# Patient Record
Sex: MALE | NOT HISPANIC OR LATINO | Employment: OTHER | URBAN - METROPOLITAN AREA
[De-identification: names, ages, dates, MRNs, and addresses within clinical notes are randomized per-mention and may not be internally consistent; named-entity substitution may affect disease eponyms.]

---

## 2018-07-02 ENCOUNTER — TELEPHONE (OUTPATIENT)
Dept: FAMILY MEDICINE CLINIC | Facility: CLINIC | Age: 68
End: 2018-07-02

## 2018-07-02 ENCOUNTER — OFFICE VISIT (OUTPATIENT)
Dept: FAMILY MEDICINE CLINIC | Facility: CLINIC | Age: 68
End: 2018-07-02
Payer: MEDICARE

## 2018-07-02 VITALS — DIASTOLIC BLOOD PRESSURE: 70 MMHG | WEIGHT: 160 LBS | SYSTOLIC BLOOD PRESSURE: 110 MMHG

## 2018-07-02 DIAGNOSIS — R05.9 COUGH: Primary | ICD-10-CM

## 2018-07-02 DIAGNOSIS — F01.50 VASCULAR DEMENTIA WITHOUT BEHAVIORAL DISTURBANCE (HCC): ICD-10-CM

## 2018-07-02 PROCEDURE — 99213 OFFICE O/P EST LOW 20 MIN: CPT | Performed by: SPECIALIST

## 2018-07-02 RX ORDER — ASPIRIN AND DIPYRIDAMOLE 25; 200 MG/1; MG/1
1 CAPSULE, EXTENDED RELEASE ORAL EVERY 12 HOURS SCHEDULED
COMMUNITY

## 2018-07-02 RX ORDER — CHOLECALCIFEROL (VITAMIN D3) 1250 MCG
1 CAPSULE ORAL 2 TIMES WEEKLY
COMMUNITY

## 2018-07-02 RX ORDER — LEVOTHYROXINE SODIUM 0.03 MG/1
25 TABLET ORAL DAILY
COMMUNITY

## 2018-07-02 RX ORDER — MEMANTINE HYDROCHLORIDE 10 MG/1
10 TABLET ORAL 2 TIMES DAILY
COMMUNITY

## 2018-07-02 NOTE — PROGRESS NOTES
Assessment/Plan:  NEEDS  PT  AND  HOME  HEALTH  AIDE     Diagnoses and all orders for this visit:    Vascular dementia without behavioral disturbance    Other orders  -     levothyroxine 25 mcg tablet; Take 25 mcg by mouth daily  -     Saw Palmetto, Serenoa repens, ([facility] SAW PALMETTO) 450 MG CAPS; Take by mouth  -     memantine (NAMENDA) 10 mg tablet; Take 10 mg by mouth 2 (two) times a day  -     Multiple Vitamin (MULTI VITAMIN DAILY PO); Take 1 tablet by mouth once a week  -     Cholecalciferol (VITAMIN D3) 07912 units CAPS; Take 1 capsule by mouth 2 (two) times a week  -     aspirin-dipyridamole (AGGRENOX)  mg per 12 hr capsule; Take 1 capsule by mouth every 12 (twelve) hours          Subjective:      Patient ID: Ambrosio Butt is a 79 y o  female  THIS IS A FOLLOW-UP VISIT WITH MULTI-INFARCT DEMENTIA PATIENT IS COOPERATIVE HOWEVER HE NEEDS HELP TO ADDRESS IS WEARING DIAPER IS RIGHT LOWER EXTREMITY WEAKNESS USES A CANE HIS HEAD  NO LYTES TO BE TIRED  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 PATIENT NEEDS WILL INCLUDE HOME HEALTH AIDE AND HOME VISITING  HE DOES NOT HAVE ANY NEW MOTOR WEAKNESS IS IS QUITE HARD OF HEARING HIS WEIGHT AND APPETITE ARE WITHIN NORMAL LIMITS NORMAL GAIT  AND HAS DIFFICULTY FOLLOWING DIRECTIONS FOR EXAMPLE TO SHOWER HE NEEDS HELP  HE HAS PROGRESSIVELY SHOW OR COGNITIVE DETERIORATION AND HAS BEEN SOMEWHAT WORSE THAN BEFORE SINCE HE HAS HAD MORE PROBLEMS IN THE PAST  HE HAS A WALKER AT HOME BUT PRESENTLY USING A SINGLE-POINT CANE IN THE RIGHT HAND-HE DOES NOT INITIATE CONVERSATION  HE RECENTLY RETURNED FROM [facility] IN INITIALLY TO WHEN   QUESTIONED   REGARDING HIS RECENT TRIP HE COULD NOT REMEMBER THAT IS LEFT THE COUNTRY    PRE AND        The following portions of the patient's history were reviewed and updated as appropriate: allergies, current medications, past family history, past medical history, past social history, past surgical history and problem list     Review of Systems   Constitutional: Positive for activity change and fatigue  Negative for appetite change, chills, diaphoresis and fever  HENT: Positive for hearing loss and trouble swallowing  Negative for sinus pain and sinus pressure  Eyes: Positive for visual disturbance  Respiratory: Positive for cough  Negative for choking, chest tightness, shortness of breath, wheezing and stridor  Cardiovascular: Positive for chest pain and leg swelling  Negative for palpitations  Gastrointestinal: Negative for abdominal distention and abdominal pain  Endocrine: Negative for cold intolerance and heat intolerance  Genitourinary:        USES  DEPENDS   INCONTINENT   Musculoskeletal: Positive for gait problem  Negative for arthralgias, back pain, joint swelling, myalgias and neck pain  Skin: Negative for color change, pallor and rash  Neurological: Positive for dizziness and weakness  Negative for tremors, seizures, syncope, facial asymmetry, speech difficulty, light-headedness, numbness and headaches  RIGHT  LOWER  EXTREMITY  WEAKNESS  USES  CANE  RIGHT  HAND  OR  A  WALKER    HX  FALLING  GETTING  WORSE  CAN  NOT  FOLLOW  DIRECTIONS  WELL AND  SEQUENCE  THINGS  NO  SPONTANEOUS  CONVERSATION   Hematological: Negative for adenopathy  Psychiatric/Behavioral: Positive for confusion and decreased concentration  Negative for agitation, behavioral problems, dysphoric mood and hallucinations  The patient is not nervous/anxious and is not hyperactive  CRIES   PSEUDOBULBAR  AFFECTIVE  DISORDER         Objective:      /70 (BP Location: Left arm, Patient Position: Sitting, Cuff Size: Standard)   Wt 72 6 kg (160 lb)          Physical Exam   Constitutional: She is oriented to person, place, and time  QUIET   FLAT  AFFECT   HENT:   Head: Normocephalic  Mouth/Throat: Oropharynx is clear and moist    Eyes: Conjunctivae are normal  Pupils are equal, round, and reactive to light   No scleral icterus  Neck: No JVD present  No thyromegaly present  Cardiovascular: Normal rate, regular rhythm and normal heart sounds  Exam reveals no gallop  No murmur heard  Pulmonary/Chest: No respiratory distress  She has no wheezes  HAS  DECREASED  BS  BASES  AND  A  FEW  FINE  RALES  LEFT  GREATER  THAN  RIGHT    NEEDS  CHEST  FILM  POSSIBLE  VIDEO  SWALLOW   Abdominal: She exhibits no distension and no mass  There is no tenderness  There is no rebound and no guarding  Musculoskeletal: She exhibits edema  She exhibits no tenderness or deformity  DEPENDENT   SITS  A  LOT      Lymphadenopathy:     She has cervical adenopathy  Neurological: She is alert and oriented to person, place, and time  Coordination abnormal    Skin: Skin is warm and dry  No rash noted  No erythema  No pallor     Psychiatric:   HAS  DEMENTIA  HAS  BEEN  FOLLOWED  BY  DR Hetal Vu  NEUROLOGY    COULD  NOT  REMEMBER  GOING  TO  [facility]    AND  THIS   WAS  A  RECENT   TRIP    FOLLOWS  BASIC  INSTRUCTIONS  IN  THE  OFFICE   DID   PUT  HIS  SHIRT  IN  PLACE   AND   BUTTONED  HOS  TROUSERS

## 2018-07-02 NOTE — LETTER
July 13, 2018     Tiffanie Howell MD  [address] [facility] [address]    Patient: Ronaldo Thurston   YOB: 1950   Date of Visit: 7/2/2018       Please arrange PT and evaluation for home health aid   Consult and Treat    DX:  [id number]  [id number]          Tiffanie Howell MD        CC: Rich Martinez  [facility]Hosp[facility]

## 2018-07-03 NOTE — TELEPHONE ENCOUNTER
Spoke to Dr Sara Martino (Mrs ) and notified her that her husbands chest xray slip is at [facility] out patient and she told me she will take him today or tomorrow to have the xray done

## 2018-07-05 ENCOUNTER — TRANSCRIBE ORDERS (OUTPATIENT)
Dept: ADMINISTRATIVE | Facility: HOSPITAL | Age: 68
End: 2018-07-05

## 2018-07-05 ENCOUNTER — HOSPITAL ENCOUNTER (OUTPATIENT)
Dept: RADIOLOGY | Facility: HOSPITAL | Age: 68
Discharge: HOME | End: 2018-07-05
Payer: MEDICARE

## 2018-07-05 DIAGNOSIS — R05.9 COUGH: ICD-10-CM

## 2018-07-05 DIAGNOSIS — R05.9 COUGH: Primary | ICD-10-CM

## 2018-07-05 PROCEDURE — 71046 X-RAY EXAM CHEST 2 VIEWS: CPT

## 2018-07-09 ENCOUNTER — TELEPHONE (OUTPATIENT)
Dept: FAMILY MEDICINE CLINIC | Facility: CLINIC | Age: 68
End: 2018-07-09

## 2018-07-09 DIAGNOSIS — I63.50 CEREBROVASCULAR ACCIDENT (CVA) DUE TO OCCLUSION OF CEREBRAL ARTERY (HCC): Primary | ICD-10-CM

## 2018-07-09 NOTE — TELEPHONE ENCOUNTER
I called the wife per Dr Jadine Riedel the xray is negative and she will inform her     Also Armen Karimi was informe dthe Eugenie Lares will not be going to the home it will be [facility] for home health and physical therapy per Jadine Riedel

## 2018-07-09 NOTE — TELEPHONE ENCOUNTER
Patient's daughter Dl Oconnor called to get the status of getting a visiting nurse for a father  She stated that several requests have been put in for this and no has called her back with any updates regarding this  Please call the patient's daughter to advise at [phone]

## 2018-07-09 NOTE — TELEPHONE ENCOUNTER
[facility] does not provide home health aide  Please send to correct facility  She will need a private care per [facility]

## 2018-07-11 ENCOUNTER — TELEPHONE (OUTPATIENT)
Dept: FAMILY MEDICINE CLINIC | Facility: CLINIC | Age: 68
End: 2018-07-11

## 2018-07-11 NOTE — TELEPHONE ENCOUNTER
[facility] and spoke with Julianne Fatima  She told me to have referral for patient faxed to their number [phone]  They will contact patient and schedule

## 2018-07-13 ENCOUNTER — TELEPHONE (OUTPATIENT)
Dept: FAMILY MEDICINE CLINIC | Facility: CLINIC | Age: 68
End: 2018-07-13

## 2018-07-13 NOTE — TELEPHONE ENCOUNTER
Spoke with to patient's wife and notified her that the [facility] office needs to verify his health insurance before they can come out to evaluate him and start his PT  She will contact Kat Lester at [phone] to get them the updated and correct insurance information

## 2018-07-18 ENCOUNTER — TELEPHONE (OUTPATIENT)
Dept: FAMILY MEDICINE CLINIC | Facility: CLINIC | Age: 68
End: 2018-07-18

## 2018-07-18 NOTE — TELEPHONE ENCOUNTER
Adria Rebolledo from [facility] home health care calling to let us know that they will be conducting PT 2 times a week for 4 weeks and wanted a verbal from Dr Immanuel Blackwood to order Occupational therapy, Speech therapy and a  for when his therapy is complete to establish long term plans for patient  Per Dr Herve Hernandez, yes please order the above

## 2018-09-05 ENCOUNTER — TELEPHONE (OUTPATIENT)
Dept: FAMILY MEDICINE CLINIC | Facility: CLINIC | Age: 68
End: 2018-09-05

## 2018-09-05 DIAGNOSIS — R41.89 COGNITIVE DEFICIT DUE TO MULTIPLE ACUTE SUBCORTICAL CEREBROVASCULAR ACCIDENTS (CVAS) (HCC): Primary | ICD-10-CM

## 2018-09-05 DIAGNOSIS — R47.9 SPEECH DISTURBANCE, UNSPECIFIED TYPE: ICD-10-CM

## 2018-09-05 DIAGNOSIS — R26.9 NEUROLOGIC GAIT DYSFUNCTION: ICD-10-CM

## 2018-09-05 DIAGNOSIS — I63.9 COGNITIVE DEFICIT DUE TO MULTIPLE ACUTE SUBCORTICAL CEREBROVASCULAR ACCIDENTS (CVAS) (HCC): Primary | ICD-10-CM

## 2018-09-05 NOTE — TELEPHONE ENCOUNTER
Kajal from [facility] called a requested a script for physical therapy, speech therapy and occupational therapy for the patient  She would like it faxed to [phone]    Any questions you can reach Kajal at [phone]

## 2018-09-13 ENCOUNTER — TELEPHONE (OUTPATIENT)
Dept: FAMILY MEDICINE CLINIC | Facility: CLINIC | Age: 68
End: 2018-09-13

## 2018-09-13 NOTE — TELEPHONE ENCOUNTER
Dr Otilia Roberts called and requested that the referral for [facility] (OUTPATIENT [facility]AMP[facility]) be taken care as soon as possible  It needs to be for physical, speech & occupational therapy  It can be faxed to [phone] attn: Kajal  Please notified   Dr Otilia Roberts when the referral is sent over at [phone]

## 2019-03-14 ENCOUNTER — TELEPHONE (OUTPATIENT)
Dept: FAMILY MEDICINE CLINIC | Facility: CLINIC | Age: 69
End: 2019-03-14

## 2019-03-14 NOTE — TELEPHONE ENCOUNTER
Patient's daughter called and stated that she needs to have get a new script PT/OT/Speech therapy and this needs to for [place]  If there are any questions please feel free to call the patient's daughter at [phone]  The patient's daughter also requested that her father have a hospital bed as well

## 2019-06-21 ENCOUNTER — TELEPHONE (OUTPATIENT)
Dept: FAMILY MEDICINE CLINIC | Facility: CLINIC | Age: 69
End: 2019-06-21

## 2019-09-13 ENCOUNTER — TELEPHONE (OUTPATIENT)
Dept: FAMILY MEDICINE CLINIC | Facility: CLINIC | Age: 69
End: 2019-09-13

## 2019-09-13 NOTE — TELEPHONE ENCOUNTER
Wife called to inform us that her  had a stroke  She wants Dr Ivelisse Tolbert to approval  a referral to [facility] (OUTPATIENT CAMPUS)  For speech, pt, and occupational therapy  Faxed referral to [facility] rehab, see scanned copy

## 2019-10-14 ENCOUNTER — OFFICE VISIT (OUTPATIENT)
Dept: FAMILY MEDICINE CLINIC | Facility: CLINIC | Age: 69
End: 2019-10-14
Payer: MEDICARE

## 2019-10-14 VITALS
HEART RATE: 120 BPM | DIASTOLIC BLOOD PRESSURE: 82 MMHG | OXYGEN SATURATION: 96 % | SYSTOLIC BLOOD PRESSURE: 120 MMHG | TEMPERATURE: 98.4 F

## 2019-10-14 DIAGNOSIS — I63.9 COGNITIVE DEFICIT DUE TO MULTIPLE ACUTE SUBCORTICAL CEREBROVASCULAR ACCIDENTS (CVAS) (HCC): Primary | ICD-10-CM

## 2019-10-14 DIAGNOSIS — R47.9 SPEECH DISTURBANCE, UNSPECIFIED TYPE: ICD-10-CM

## 2019-10-14 DIAGNOSIS — R41.89 COGNITIVE DEFICIT DUE TO MULTIPLE ACUTE SUBCORTICAL CEREBROVASCULAR ACCIDENTS (CVAS) (HCC): Primary | ICD-10-CM

## 2019-10-14 DIAGNOSIS — I63.50 CEREBROVASCULAR ACCIDENT (CVA) DUE TO OCCLUSION OF CEREBRAL ARTERY (HCC): ICD-10-CM

## 2019-10-14 DIAGNOSIS — Z01.818 PREOP EXAMINATION: ICD-10-CM

## 2019-10-14 DIAGNOSIS — R26.9 NEUROLOGIC GAIT DYSFUNCTION: ICD-10-CM

## 2019-10-14 DIAGNOSIS — Z00.00 MEDICARE ANNUAL WELLNESS VISIT, SUBSEQUENT: ICD-10-CM

## 2019-10-14 DIAGNOSIS — F01.50 VASCULAR DEMENTIA WITHOUT BEHAVIORAL DISTURBANCE (HCC): ICD-10-CM

## 2019-10-14 PROCEDURE — G0439 PPPS, SUBSEQ VISIT: HCPCS | Performed by: SPECIALIST

## 2019-10-14 PROCEDURE — 99214 OFFICE O/P EST MOD 30 MIN: CPT | Performed by: SPECIALIST

## 2019-10-14 RX ORDER — ATORVASTATIN CALCIUM 20 MG/1
20 TABLET, FILM COATED ORAL DAILY
COMMUNITY
Start: 2019-03-25

## 2019-10-14 RX ORDER — GLYCOPYRROLATE 1 MG/1
TABLET ORAL
Refills: 0 | COMMUNITY
Start: 2019-09-27

## 2019-10-14 RX ORDER — TRAZODONE HYDROCHLORIDE 50 MG/1
TABLET ORAL
Refills: 0 | COMMUNITY
Start: 2019-08-06

## 2019-10-14 RX ORDER — CLOPIDOGREL BISULFATE 75 MG/1
75 TABLET ORAL DAILY
COMMUNITY

## 2019-10-14 RX ORDER — DOXAZOSIN 2 MG/1
2 TABLET ORAL
COMMUNITY
Start: 2019-07-12 | End: 2020-07-11

## 2019-10-14 RX ORDER — MEGESTROL ACETATE 40 MG/1
TABLET ORAL
Refills: 1 | COMMUNITY
Start: 2019-10-05

## 2019-10-14 RX ORDER — MODAFINIL 100 MG/1
TABLET ORAL
Refills: 0 | COMMUNITY
Start: 2019-08-09

## 2019-10-14 NOTE — PROGRESS NOTES
Assessment/Plan:    Pt  Seen  Pre  Op  Surgery   For   Right  Dental  Abscess  And  Fistula   Cat  Scan  Noted   Basic  Lab  Ok    Increased  Heart  Rate   D/c   The  25  Levothyroxine     Xray   Right  midlle  Lobe infiltrate    Afebrile   No  Distress    o  Sat  Fine  Room    Air           Patient seen in office today  During the visit I was accompanied by MA while physical exam was completed  No problem-specific Assessment & Plan notes found for this encounter  Problem List Items Addressed This Visit     None            Subjective:     BMI Counseling: There is no height or weight on file to calculate BMI  The BMI is above normal  No BMI follow-up plan is appropriate  Patient is 74 years old and weight reduction/weight gain would further complicate their underlying physical disability  Depression Screening Follow-up Plan: Patient's depression screening was positive with a PHQ-2 score of   Their PHQ-9 score was   Clinically patient does not have depression  No treatment is required  Patient ID: Monty Schmid is a 81 y o  male       24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 recurrent multiple strokes   He is unable to walk   Dysphagia    Has a PEG tube    Incontinent of urine and stool     Developed a decubitus ulcer in the left buttock and slight redness on the right    He has drooling and is felt the his recent fevers were due to the dental abscess with CT scan showing possible fistula track       he has baseline sinus tachycardia EKG does not show any acute changes I recommend holding any levothyroxine which is low-dose     his chest x-ray shows infiltrate or atelectasis right middle lobe his lungs are clear he is in no respiratory distress he is afebrile his oxygen saturation is very good on room air     the procedure proposed to examine his right dental abscess and possibly graded drainage is necessary and cannot be treated as an outpatient in the dental office setting      The following portions of the patient's history were reviewed and updated as appropriate: allergies, current medications, past family history, past medical history, past social history, past surgical history and problem list     Review of Systems   Constitutional: Positive for activity change and appetite change  Negative for chills, diaphoresis, fatigue and fever  HENT: Positive for dental problem  Negative for congestion, sinus pressure and sinus pain  Patient does not speak and cannot swallow   Eyes:         He does have a history of visual loss   Respiratory: Negative for cough, shortness of breath and wheezing  Cardiovascular: Negative for chest pain  Gastrointestinal: Negative for abdominal distention, abdominal pain and blood in stool  Incontinent of stool   Genitourinary:        Incontinent of urine   Musculoskeletal: Positive for gait problem  Negative for arthralgias and back pain  Cannot walk   Neurological: Negative for seizures and facial asymmetry  Cannot adequately movement uses arms appropriately     he has a history of diffuse arm weakness especially the right leg   Psychiatric/Behavioral: Negative for agitation  Objective:      /82 (BP Location: Left arm, Patient Position: Sitting, Cuff Size: Standard)   Pulse (!) 120   Temp 98 4 °F (36 9 °C) (Tympanic)   SpO2 96%          Physical Exam   Constitutional:    Patient seen sitting in a wheelchair with wife present unable to speak or to swallow   And cannot move with any purpose would not be able to raise his arms or legs or on any commands   HENT:    Has known dental abscess on CT scan probable fistula track but cannot adequately see this on examination of his oral cavity   Eyes: Pupils are equal, round, and reactive to light  No scleral icterus  Neck: No JVD present  Cardiovascular: Regular rhythm and normal heart sounds      Heart rate is increased is regular no distinct murmurs heard   Pulmonary/Chest:    Lungs sound clear with decreased breath sounds bilaterally no wheezing or any rales   Abdominal: Soft  Musculoskeletal: He exhibits no edema     Neurological:    Neurologic clear at this time he requires complete care   Skin:    Stage 2-3 left buttock ulceration the right buttock shows some redness of the skin

## 2019-10-14 NOTE — PROGRESS NOTES
Assessment and Plan:     Problem List Items Addressed This Visit     None           Preventive health issues were discussed with patient, and age appropriate screening tests were ordered as noted in patient's After Visit Summary  Personalized health advice and appropriate referrals for health education or preventive services given if needed, as noted in patient's After Visit Summary  History of Present Illness:     Patient presents for Welcome to Medicare visit  Patient Care Team:  Iman Lange MD as PCP - General (Internal Medicine)     Review of Systems:     Review of Systems   Problem List:     There is no problem list on file for this patient  Past Medical and Surgical History:     Past Medical History:   Diagnosis Date    Lacunar infarction     (BRAIN) MICROHEMORRHAGES     No past surgical history on file     Family History:     Family History   Problem Relation Age of Onset    Coronary artery disease Family     Hypertension Family       Social History:     Social History     Socioeconomic History    Marital status: /Civil Union     Spouse name: Not on file    Number of children: Not on file    Years of education: Not on file    Highest education level: Not on file   Occupational History    Not on file   Social Needs    Financial resource strain: Not on file    Food insecurity:     Worry: Not on file     Inability: Not on file    Transportation needs:     Medical: Not on file     Non-medical: Not on file   Tobacco Use    Smoking status: Former Smoker    Smokeless tobacco: Never Used    Tobacco comment: no passive smoke exposure   Substance and Sexual Activity    Alcohol use: No    Drug use: No    Sexual activity: Not on file   Lifestyle    Physical activity:     Days per week: Not on file     Minutes per session: Not on file    Stress: Not on file   Relationships    Social connections:     Talks on phone: Not on file     Gets together: Not on file     Attends Hinduism service: Not on file     Active member of club or organization: Not on file     Attends meetings of clubs or organizations: Not on file     Relationship status: Not on file    Intimate partner violence:     Fear of current or ex partner: Not on file     Emotionally abused: Not on file     Physically abused: Not on file     Forced sexual activity: Not on file   Other Topics Concern    Not on file   Social History Narrative    Not on file      Medications and Allergies:     Current Outpatient Medications   Medication Sig Dispense Refill    aspirin-dipyridamole (AGGRENOX)  mg per 12 hr capsule Take 1 capsule by mouth every 12 (twelve) hours      Cholecalciferol (VITAMIN D3) 14114 units CAPS Take 1 capsule by mouth 2 (two) times a week      levothyroxine 25 mcg tablet Take 25 mcg by mouth daily      memantine (NAMENDA) 10 mg tablet Take 10 mg by mouth 2 (two) times a day      Multiple Vitamin (MULTI VITAMIN DAILY PO) Take 1 tablet by mouth once a week      Saw Palmetto, Serenoa repens, ([facility] SAW PALMETTO) 450 MG CAPS Take by mouth       No current facility-administered medications for this visit  Allergies   Allergen Reactions    Penicillins       Immunizations: There is no immunization history on file for this patient  Health Maintenance:         Topic Date Due    Hepatitis C Screening  1950    CRC Screening: Colonoscopy  1950         Topic Date Due    DTaP,Tdap,and Td Vaccines (1 - Tdap) 06/10/1973    Pneumococcal Vaccine: 65+ Years (1 of 2 - PCV13) 06/10/2017    INFLUENZA VACCINE  07/01/2019      Medicare Screening Tests and Risk Assessments:     Melisa Painter is here for his Initial Wellness visit  Last Medicare Wellness visit information reviewed, patient interviewed and updates made to the record today  Health Risk Assessment:   Patient rates overall health as fair  Patient feels that their physical health rating is same  Eyesight was rated as same   Hearing was rated as same  Patient feels that their emotional and mental health rating is same  Pain experienced in the last 7 days has been none  Patient states that he has experienced no weight loss or gain in last 6 months  Fall Risk Screening: In the past year, patient has experienced: no history of falling in past year      Home Safety:  Patient has trouble with stairs inside or outside of their home  Patient has working smoke alarms and has working carbon monoxide detector  Home safety hazards include: none  Nutrition:   Current diet is Other (please comment)  Jeviti 1 2 _ Feeding Tube    Medications:   Patient is currently taking over-the-counter supplements  OTC medications include: see medication list  Patient is able to manage medications  Wife Handles all medications     Activities of Daily Living (ADLs)/Instrumental Activities of Daily Living (IADLs):   Walk and transfer into and out of bed and chair?: No  Dress and groom yourself?: No    Bathe or shower yourself?: No    Feed yourself? No  Do your laundry/housekeeping?: No  Manage your money, pay your bills and track your expenses?: No  Make your own meals?: No    Do your own shopping?: No    ADL comments: Assistant from wife     Advance Care Planning:   Living will: Yes    Durable POA for healthcare:  Yes    Advanced directive: Yes    Advanced directive counseling given: Yes    Five wishes given: Yes    End of Life Decisions reviewed with patient: Yes    Provider agrees with end of life decisions: Yes      PREVENTIVE SCREENINGS      Cardiovascular Screening:    General: Screening Current      Diabetes Screening:     General: Risks and Benefits Discussed      Colorectal Cancer Screening:     General: Patient Declines and Risks and Benefits Discussed      Prostate Cancer Screening:    General: Risks and Benefits Discussed and Patient Declines      Osteoporosis Screening:    General: Risks and Benefits Discussed and Patient Declines      Abdominal Aortic Aneurysm (AAA) Screening:    Risk factors include: age between 73-69 yo and tobacco use        Lung Cancer Screening:     General: Risks and Benefits Discussed and Patient Declines      Hepatitis C Screening:    General: Risks and Benefits Discussed and Patient Declines    No exam data present     Physical Exam:     There were no vitals taken for this visit      Physical Exam    Cinthia Nguyen MD

## 2019-10-14 NOTE — PATIENT INSTRUCTIONS
For dental extraction and possible drainage procedures   Needs to be monitored carefully for heart rate   And any respiratory distress     procedures considered a dental necessity and cannot be treated in any other fashion     has received antibiotic therapy in this has caused him to be afebrile     continue rehabilitation with [address] [place] [address]     continue treatment of decubitus ulcers     hold Plavix 5 days prior to surgery for his surgery and 6 day

## 2019-10-17 ENCOUNTER — TELEPHONE (OUTPATIENT)
Dept: FAMILY MEDICINE CLINIC | Facility: CLINIC | Age: 69
End: 2019-10-17

## 2019-10-22 ENCOUNTER — TELEPHONE (OUTPATIENT)
Dept: FAMILY MEDICINE CLINIC | Facility: CLINIC | Age: 69
End: 2019-10-22